# Patient Record
Sex: MALE | Race: WHITE | NOT HISPANIC OR LATINO
[De-identification: names, ages, dates, MRNs, and addresses within clinical notes are randomized per-mention and may not be internally consistent; named-entity substitution may affect disease eponyms.]

---

## 2021-05-14 PROBLEM — Z00.00 ENCOUNTER FOR PREVENTIVE HEALTH EXAMINATION: Status: ACTIVE | Noted: 2021-05-14

## 2021-05-26 ENCOUNTER — NON-APPOINTMENT (OUTPATIENT)
Age: 81
End: 2021-05-26

## 2021-05-26 ENCOUNTER — APPOINTMENT (OUTPATIENT)
Dept: CARDIOLOGY | Facility: CLINIC | Age: 81
End: 2021-05-26
Payer: MEDICARE

## 2021-05-26 VITALS
DIASTOLIC BLOOD PRESSURE: 70 MMHG | TEMPERATURE: 98.1 F | HEART RATE: 93 BPM | SYSTOLIC BLOOD PRESSURE: 128 MMHG | RESPIRATION RATE: 16 BRPM | HEIGHT: 73 IN | BODY MASS INDEX: 25.71 KG/M2 | WEIGHT: 194 LBS | OXYGEN SATURATION: 97 %

## 2021-05-26 DIAGNOSIS — Z78.9 OTHER SPECIFIED HEALTH STATUS: ICD-10-CM

## 2021-05-26 DIAGNOSIS — Z98.890 OTHER SPECIFIED POSTPROCEDURAL STATES: ICD-10-CM

## 2021-05-26 DIAGNOSIS — Z87.39 PERSONAL HISTORY OF OTHER DISEASES OF THE MUSCULOSKELETAL SYSTEM AND CONNECTIVE TISSUE: ICD-10-CM

## 2021-05-26 DIAGNOSIS — Z80.9 FAMILY HISTORY OF MALIGNANT NEOPLASM, UNSPECIFIED: ICD-10-CM

## 2021-05-26 DIAGNOSIS — I38 ENDOCARDITIS, VALVE UNSPECIFIED: ICD-10-CM

## 2021-05-26 DIAGNOSIS — Z83.3 FAMILY HISTORY OF DIABETES MELLITUS: ICD-10-CM

## 2021-05-26 PROCEDURE — 93242 EXT ECG>48HR<7D RECORDING: CPT

## 2021-05-26 PROCEDURE — 93000 ELECTROCARDIOGRAM COMPLETE: CPT

## 2021-05-26 PROCEDURE — 99205 OFFICE O/P NEW HI 60 MIN: CPT

## 2021-05-26 PROCEDURE — 99072 ADDL SUPL MATRL&STAF TM PHE: CPT

## 2021-05-26 NOTE — ASSESSMENT
[FreeTextEntry1] : Today's ECG reviewed.  Atrial fibrillation.  He is asymptomatic.  Continue anticoagulation.  Continue metoprolol for rate control.  Electrophysiology consultation regarding discussion about possible ablation.  Zio patch will be done for 3 days to reassure A. fib rate control.  He had stress test and echocardiogram done.  Will obtain records and review.  Follow-up after Zio patch.

## 2021-05-26 NOTE — HISTORY OF PRESENT ILLNESS
[FreeTextEntry1] : The patient is presenting here today for cardiac evaluation.  The patient is 81-year-old male with a history of hypertension.  He was recently in [place].  Patient was found to be in atrial fibrillation.  He underwent transesophageal echo and cardioversion.  Converted to normal sinus rhythm.  He was anticoagulated with Xarelto.  He was started on metoprolol.  He is physically very active.  No symptoms of any chest pains or shortness of breath.  No syncope.

## 2021-06-02 ENCOUNTER — NON-APPOINTMENT (OUTPATIENT)
Age: 81
End: 2021-06-02

## 2021-06-14 ENCOUNTER — NON-APPOINTMENT (OUTPATIENT)
Age: 81
End: 2021-06-14

## 2021-06-16 ENCOUNTER — APPOINTMENT (OUTPATIENT)
Dept: CARDIOLOGY | Facility: CLINIC | Age: 81
End: 2021-06-16
Payer: MEDICARE

## 2021-06-16 VITALS
HEART RATE: 90 BPM | DIASTOLIC BLOOD PRESSURE: 80 MMHG | HEIGHT: 73 IN | BODY MASS INDEX: 26.77 KG/M2 | OXYGEN SATURATION: 98 % | SYSTOLIC BLOOD PRESSURE: 138 MMHG | WEIGHT: 202 LBS

## 2021-06-16 PROCEDURE — 99215 OFFICE O/P EST HI 40 MIN: CPT

## 2021-06-16 PROCEDURE — 99072 ADDL SUPL MATRL&STAF TM PHE: CPT

## 2021-06-16 NOTE — ASSESSMENT
[FreeTextEntry1] : Today's ECG reviewed.  Atrial fibrillation.  He is asymptomatic.  Continue anticoagulation.  Continue metoprolol for rate control.  Electrophysiology consultation regarding discussion about possible ablation.  Zio patch will be done for 3 days to reassure A. fib rate control.  He had stress test and echocardiogram done.  Records were obtained and reviewed.  He has been feeling fatigued.  The dose of beta-blocker was reduced.  He is feeling better.  Results of Zio patch are pending.  I again advised him to see electrophysiology to consider ablation.  Follow-up after EP consult.

## 2021-06-18 PROCEDURE — 99072 ADDL SUPL MATRL&STAF TM PHE: CPT

## 2021-06-18 PROCEDURE — 93244 EXT ECG>48HR<7D REV&INTERPJ: CPT

## 2021-06-30 ENCOUNTER — APPOINTMENT (OUTPATIENT)
Dept: ELECTROPHYSIOLOGY | Facility: CLINIC | Age: 81
End: 2021-06-30
Payer: MEDICARE

## 2021-06-30 ENCOUNTER — NON-APPOINTMENT (OUTPATIENT)
Age: 81
End: 2021-06-30

## 2021-06-30 VITALS
TEMPERATURE: 97.8 F | BODY MASS INDEX: 26.77 KG/M2 | WEIGHT: 202 LBS | HEART RATE: 92 BPM | OXYGEN SATURATION: 96 % | DIASTOLIC BLOOD PRESSURE: 60 MMHG | HEIGHT: 73 IN | SYSTOLIC BLOOD PRESSURE: 120 MMHG

## 2021-06-30 PROCEDURE — 99072 ADDL SUPL MATRL&STAF TM PHE: CPT

## 2021-06-30 PROCEDURE — 93000 ELECTROCARDIOGRAM COMPLETE: CPT

## 2021-06-30 PROCEDURE — 99204 OFFICE O/P NEW MOD 45 MIN: CPT

## 2021-07-01 ENCOUNTER — APPOINTMENT (OUTPATIENT)
Dept: CARDIOLOGY | Facility: CLINIC | Age: 81
End: 2021-07-01

## 2021-07-04 NOTE — DISCUSSION/SUMMARY
[FreeTextEntry1] : The patient has recurrent atrial fibrillation the rate appears to be controlled.  I do believe he has some symptoms which includes fatigue and shortness of breath with exertion.  The best option for this patient would be restoration of sinus rhythm.  He has failed electrical cardioversion.  The 2 options in this patient has would be either one of the usage of an antiarrhythmic agent to help maintain sinus rhythm or catheter ablation procedure.  Would recommend antiarrhythmic agent and electrical cardioversion first and then assess how well he feels.  If he has significant improvement in symptoms then we would consider catheter ablation procedure.  This was discussed with the patient and he is in agreement.  The choice of antiarrhythmic was fully discussed.  We discussed the usage of amiodarone including side effect profile as well as black box warning time duration.  Recommend a loading phase and then subsequent electrical cardioversion within the next 10 days.  Also discussed possible bloody bowel movement and need to follow-up with GI. \par \par Would recommend starting him on amiodarone now and then repeat an electrical cardioversion in approximately 10 to 14 days after the amiodarone has taken effect.  Again we can discuss whether he should have an ablation subsequent to that.  I would also like for him to have a GI evaluation soon given his need to continue on anticoagulation and question of lower GI bleed.  l\par \par Recommendations:\par \par Start amiodarone\par \par GI evaluation\par \par Continue anticoagulation for now\par \par Subsequent electrical cardioversion after amiodarone load\par \par  no

## 2021-07-04 NOTE — HISTORY OF PRESENT ILLNESS
[FreeTextEntry1] : The patient is a 81-year-old  who is seen in evaluation for persistent atrial fibrillation.\par \par He had  A. fib discovered earlier this year on a routine electrocardiogram.  Based on this finding his physicians in [place] had him undergo a HARRIET and then a subsequent electrical cardioversion.  He is not aware whether his exercise capacity/stamina improved after the cardioversion procedure.  He was on metoprolol 50 mg/day and was feeling fatigued and tired.  He was seen by Dr. Arleth Alicea and the dose was decreased by half and had improvement in symptoms.  He was also noted to have recurrent atrial fibrillation.  He has had symptoms of fatigue as well as shortness of breath when he rushes.  He denies chest pain, dizziness, lightheadedness, syncope or presyncope.  \par \par HARRIET performed on 4/29/2021 showed no evidence of thrombus.  The left ventricular ejection fraction was 50%.  There was mild mitral regurgitation.  He underwent electrical cardioversion with 50 J to sinus rhythm.\par \par He had a stress test performed while he was in [place] and that showed normal myocardial perfusion.\par The patient had a Zio patch monitor for 3 days between 5/26/2021 and 5/29/2021.  The results of this showed that he had predominantly atrial fibrillation with heart rates between 45 to 153 bpm there were occasional PVC and rare PVC couplets.\par \par The patient has a past history of hypertension.  There is no prior history of diabetes.  He has had no prior history of MI or heart failure.\par \par \par The patient has been on Xarelto 20 mg/day.  Today while in the bathroom he noticed pink/slightly reddish material in the toilet bowl and he did not know whether it was related to the urine or bowel movement.

## 2021-07-04 NOTE — REVIEW OF SYSTEMS
[Feeling Fatigued] : feeling fatigued [Dyspnea on exertion] : dyspnea during exertion [Palpitations] : palpitations [Blood in stool] : blood in stool [Fever] : no fever [Blurry Vision] : no blurred vision [Sore Throat] : no sore throat [Chest Discomfort] : no chest discomfort [Cough] : no cough [Abdominal Pain] : no abdominal pain [Urinary Frequency] : no change in urinary frequency [Pain During Urination] : no dysuria [Rash] : no rash [Dizziness] : no dizziness [Under Stress] : not under stress [Easy Bleeding] : no tendency for easy bleeding

## 2021-07-04 NOTE — PHYSICAL EXAM
[No Acute Distress] : no acute distress [Normal Conjunctiva] : normal conjunctiva [Normal Venous Pressure] : normal venous pressure [Normal S1, S2] : normal S1, S2 [No Murmur] : no murmur [No Rub] : no rub [Clear Lung Fields] : clear lung fields [Soft] : abdomen soft [Non Tender] : non-tender [Normal Gait] : normal gait [No Edema] : no edema [No Rash] : no rash [Moves all extremities] : moves all extremities [Alert and Oriented] : alert and oriented [de-identified] : Irregularly irregular

## 2021-07-22 ENCOUNTER — APPOINTMENT (OUTPATIENT)
Dept: CARDIOLOGY | Facility: CLINIC | Age: 81
End: 2021-07-22
Payer: MEDICARE

## 2021-07-22 VITALS
OXYGEN SATURATION: 97 % | HEART RATE: 82 BPM | TEMPERATURE: 98.2 F | SYSTOLIC BLOOD PRESSURE: 110 MMHG | HEIGHT: 73 IN | DIASTOLIC BLOOD PRESSURE: 64 MMHG | BODY MASS INDEX: 26.51 KG/M2 | WEIGHT: 200 LBS

## 2021-07-22 PROCEDURE — 99214 OFFICE O/P EST MOD 30 MIN: CPT

## 2021-07-22 PROCEDURE — 99072 ADDL SUPL MATRL&STAF TM PHE: CPT

## 2021-07-22 RX ORDER — TAMSULOSIN HYDROCHLORIDE 0.4 MG/1
0.4 CAPSULE ORAL
Refills: 0 | Status: DISCONTINUED | COMMUNITY
Start: 2021-05-26 | End: 2021-07-22

## 2021-07-22 NOTE — HISTORY OF PRESENT ILLNESS
[FreeTextEntry1] : The patient is presenting here today for cardiac evaluation.  The patient is 81-year-old male with a history of hypertension.  He was recently in [place].  Patient was found to be in atrial fibrillation.  He underwent transesophageal echo and cardioversion.  Converted to normal sinus rhythm.  He was anticoagulated with Xarelto.  He was started on metoprolol.  He is physically very active.  No symptoms of any chest pains or shortness of breath.  No syncope.\par Since he was started on Xarelto he developed GI bleeding.  He was diagnosed with colon cancer and underwent surgery.  He was restarted on Xarelto.  He never started amiodarone.  He feels fatigued.  No palpitations, chest pain or shortness of breath.

## 2021-07-22 NOTE — ASSESSMENT
[FreeTextEntry1] : Today's ECG reviewed.  Atrial fibrillation.  He is asymptomatic.  Continue anticoagulation.  Continue metoprolol for rate control.  Electrophysiology consultation regarding discussion about possible ablation.  Zio patch will be done for 3 days to reassure A. fib rate control.  He had stress test and echocardiogram done.  Records were obtained and reviewed.  He has been feeling fatigued.  The dose of beta-blocker was reduced.  He is feeling better.  Results of Zio patch are pending.  I again advised him to see electrophysiology to consider ablation.  \par EP consult was done.  He was advised to start amiodarone and then 2 weeks later to have cardioversion however he never started amiodarone.  He was diagnosed with colon cancer and underwent surgery.  He is stable.  I advised not to start amiodarone till chemotherapy is finished.  We will follow him in 3 months and as needed.

## 2021-08-04 ENCOUNTER — NON-APPOINTMENT (OUTPATIENT)
Age: 81
End: 2021-08-04

## 2021-08-05 ENCOUNTER — NON-APPOINTMENT (OUTPATIENT)
Age: 81
End: 2021-08-05

## 2021-08-12 ENCOUNTER — NON-APPOINTMENT (OUTPATIENT)
Age: 81
End: 2021-08-12

## 2021-08-23 ENCOUNTER — APPOINTMENT (OUTPATIENT)
Dept: CARDIOLOGY | Facility: CLINIC | Age: 81
End: 2021-08-23
Payer: MEDICARE

## 2021-08-23 VITALS
DIASTOLIC BLOOD PRESSURE: 70 MMHG | WEIGHT: 196 LBS | HEART RATE: 90 BPM | BODY MASS INDEX: 25.98 KG/M2 | SYSTOLIC BLOOD PRESSURE: 124 MMHG | HEIGHT: 73 IN | OXYGEN SATURATION: 98 %

## 2021-08-23 PROCEDURE — 99214 OFFICE O/P EST MOD 30 MIN: CPT

## 2021-08-23 RX ORDER — AMIODARONE HYDROCHLORIDE 200 MG/1
200 TABLET ORAL DAILY
Qty: 60 | Refills: 0 | Status: DISCONTINUED | COMMUNITY
Start: 2021-06-30 | End: 2021-08-23

## 2021-08-23 NOTE — ASSESSMENT
[FreeTextEntry1] : Today's ECG reviewed.  Atrial fibrillation.  He is asymptomatic.  Continue anticoagulation.  Continue metoprolol for rate control.  Electrophysiology consultation regarding discussion about possible ablation.  Zio patch will be done for 3 days to reassure A. fib rate control.  He had stress test and echocardiogram done.  Records were obtained and reviewed.  He has been feeling fatigued.  The dose of beta-blocker was reduced.  He is feeling better.  Results of Zio patch are pending.  I again advised him to see electrophysiology to consider ablation.  \par EP consult was done.  He was advised to start amiodarone and then 2 weeks later to have cardioversion however he never started amiodarone.  He was diagnosed with colon cancer and underwent surgery.  He is stable.  I advised not to start amiodarone till chemotherapy is finished.  He is anemic.  He is followed by oncology.  He did receive iron transfusions.  He is mildly fatigued but otherwise stable.  Continue present medications including anticoagulation.  No further GI bleeding.  Cardiac follow-up 3 months and as needed.

## 2021-08-24 ENCOUNTER — RX CHANGE (OUTPATIENT)
Age: 81
End: 2021-08-24

## 2021-08-24 ENCOUNTER — NON-APPOINTMENT (OUTPATIENT)
Age: 81
End: 2021-08-24

## 2021-08-27 ENCOUNTER — RX CHANGE (OUTPATIENT)
Age: 81
End: 2021-08-27

## 2021-09-21 ENCOUNTER — APPOINTMENT (OUTPATIENT)
Dept: CARDIOLOGY | Facility: CLINIC | Age: 81
End: 2021-09-21

## 2021-11-01 ENCOUNTER — APPOINTMENT (OUTPATIENT)
Dept: CARDIOLOGY | Facility: CLINIC | Age: 81
End: 2021-11-01
Payer: MEDICARE

## 2021-11-01 VITALS
HEIGHT: 73 IN | TEMPERATURE: 97.1 F | OXYGEN SATURATION: 98 % | WEIGHT: 187.44 LBS | HEART RATE: 67 BPM | DIASTOLIC BLOOD PRESSURE: 60 MMHG | SYSTOLIC BLOOD PRESSURE: 116 MMHG | BODY MASS INDEX: 24.84 KG/M2

## 2021-11-01 DIAGNOSIS — N40.0 BENIGN PROSTATIC HYPERPLASIA WITHOUT LOWER URINARY TRACT SYMPMS: ICD-10-CM

## 2021-11-01 PROCEDURE — 99214 OFFICE O/P EST MOD 30 MIN: CPT

## 2021-11-01 NOTE — ASSESSMENT
[FreeTextEntry1] :   He is asymptomatic.  Continue anticoagulation.  Continue metoprolol for rate control.  Electrophysiology consultation regarding discussion about possible ablation.  Zio patch will be done for 3 days to reassure A. fib rate control.  He had stress test and echocardiogram done.  Records were obtained and reviewed.  He has been feeling fatigued.  The dose of beta-blocker was reduced.  He is feeling better.  Results of Zio patch are pending.  I again advised him to see electrophysiology to consider ablation.  \par EP consult was done.  He was advised to start amiodarone and then 2 weeks later to have cardioversion however he never started amiodarone.  He was diagnosed with colon cancer and underwent surgery.  He is stable.  I advised not to start amiodarone till chemotherapy is finished.  He is anemic.  He is followed by oncology.  He did receive iron transfusions.  He is mildly fatigued but otherwise stable.  Continue present medications including anticoagulation.  No further GI bleeding.  Cardiac follow-up 3 months and as needed.

## 2022-05-20 ENCOUNTER — APPOINTMENT (OUTPATIENT)
Dept: CARDIOLOGY | Facility: CLINIC | Age: 82
End: 2022-05-20
Payer: MEDICARE

## 2022-05-20 VITALS
HEIGHT: 73 IN | BODY MASS INDEX: 25.71 KG/M2 | TEMPERATURE: 98 F | OXYGEN SATURATION: 97 % | WEIGHT: 194 LBS | SYSTOLIC BLOOD PRESSURE: 124 MMHG | DIASTOLIC BLOOD PRESSURE: 74 MMHG | HEART RATE: 70 BPM

## 2022-05-20 DIAGNOSIS — G62.9 POLYNEUROPATHY, UNSPECIFIED: ICD-10-CM

## 2022-05-20 DIAGNOSIS — Z85.038 PERSONAL HISTORY OF OTHER MALIGNANT NEOPLASM OF LARGE INTESTINE: ICD-10-CM

## 2022-05-20 PROCEDURE — 99214 OFFICE O/P EST MOD 30 MIN: CPT

## 2022-05-20 NOTE — HISTORY OF PRESENT ILLNESS
[FreeTextEntry1] : The patient is presenting here today for cardiac evaluation.  The patient is 82-year-old male with a history of hypertension.  He was recently in [place].  Patient was found to be in atrial fibrillation.  He underwent transesophageal echo and cardioversion.  Converted to normal sinus rhythm.  He was anticoagulated with Xarelto.  He was started on metoprolol.  He is physically very active.  No symptoms of any chest pains or shortness of breath.  No syncope.\par Since he was started on Xarelto he developed GI bleeding.  He was diagnosed with colon cancer and underwent surgery.

## 2022-05-20 NOTE — ASSESSMENT
[FreeTextEntry1] :   He is asymptomatic.  Continue anticoagulation.  Continue metoprolol for rate control.  .  The dose of beta-blocker was reduced.  He is feeling better.  Results of Zio patch are pending.  I again advised him to see electrophysiology to consider ablation.  \par  He was diagnosed with colon cancer and underwent surgery.  He is stable.  .  Continue present medications including anticoagulation.  No further GI bleeding.  Cardiac follow-up 6 months and as needed.

## 2022-08-10 ENCOUNTER — APPOINTMENT (OUTPATIENT)
Dept: ORTHOPEDIC SURGERY | Facility: CLINIC | Age: 82
End: 2022-08-10
Payer: MEDICARE

## 2022-08-10 DIAGNOSIS — S46.011A STRAIN OF MUSCLE(S) AND TENDON(S) OF THE ROTATOR CUFF OF RIGHT SHOULDER, INITIAL ENCOUNTER: ICD-10-CM

## 2022-08-10 PROCEDURE — 99214 OFFICE O/P EST MOD 30 MIN: CPT | Mod: 25

## 2022-08-10 PROCEDURE — 20611 DRAIN/INJ JOINT/BURSA W/US: CPT | Mod: RT

## 2022-08-10 PROCEDURE — 99213 OFFICE O/P EST LOW 20 MIN: CPT | Mod: 25

## 2022-08-10 PROCEDURE — 99203 OFFICE O/P NEW LOW 30 MIN: CPT | Mod: 25

## 2022-08-10 PROCEDURE — 73030 X-RAY EXAM OF SHOULDER: CPT | Mod: RT

## 2022-08-10 NOTE — HISTORY OF PRESENT ILLNESS
[de-identified] : Patient reports no improvement since his last visit in 2020, despite PT and HEP. He notes an additional fall onto an outstretched arm in a parking lot recently. The pain is worse at night and with ROM behind the back. He describes that pain as burning, stabbing and sometime a pinching/catching sensation. He is unable to take NSAIDs.

## 2022-08-10 NOTE — PHYSICAL EXAM
[NL (0-70)] : full internal rotation 0-70 degrees [NL (0-90)] : full external rotation 0-90 degrees [3 ___] : forward flexion 3[unfilled]/5 [3___] : abduction 3[unfilled]/5 [4___] : internal rotation 4[unfilled]/5 [] : motor and sensory intact distally [Right] : right shoulder [There are no fractures, subluxations or dislocations. No significant abnormalities are seen] : There are no fractures, subluxations or dislocations. No significant abnormalities are seen [[id number]] : active forward flexion 130 degrees [de-identified] : active abduction 110 degrees

## 2022-08-31 ENCOUNTER — APPOINTMENT (OUTPATIENT)
Dept: ORTHOPEDIC SURGERY | Facility: CLINIC | Age: 82
End: 2022-08-31

## 2022-08-31 VITALS — BODY MASS INDEX: 25.71 KG/M2 | WEIGHT: 194 LBS | HEIGHT: 73 IN

## 2022-08-31 DIAGNOSIS — S33.5XXA SPRAIN OF LIGAMENTS OF LUMBAR SPINE, INITIAL ENCOUNTER: ICD-10-CM

## 2022-08-31 PROCEDURE — 99214 OFFICE O/P EST MOD 30 MIN: CPT

## 2022-08-31 PROCEDURE — 73502 X-RAY EXAM HIP UNI 2-3 VIEWS: CPT | Mod: RT

## 2022-08-31 NOTE — PHYSICAL EXAM
[] : patient ambulates without assistive device [Right] : right hip with pelvis [AP] : anteroposterior [Lateral] : lateral [Mild arthritis (Tonnis Grade 1)] : Mild arthritis (Tonnis Grade 1)

## 2022-08-31 NOTE — DATA REVIEWED
[CT Scan] : CT scan [Pelvis] : pelvis [Report was reviewed and noted in the chart] : The report was reviewed and noted in the chart [I reviewed the films/CD and agree] : I reviewed the films/CD and agree [FreeTextEntry1] : EXAM: CT ABD/PELVIS W/O CONTRAST  CLINICAL HISTORY: Male, 82 years old. rt flank pain; SCT  TECHNIQUE: Axial images were obtained from the diaphragm to the pubic symphysis. Sagittal and coronal reformations were obtained.  Contrast:   RADIATION DOSE: CTDIvol Mean: 6.89, mGy/DLP: 385.29, mGy.cm Technologist Comments:  COMPARISON: CT chest abdomen pelvis from 7/2/2021  FINDINGS:  LUNG BASES: Clear. No pleural effusion.  ABDOMEN/PELVIS:  LIVER: Normal size. No mass. BILIARY TRACT: No dilatation. Contracted gallbladder. PANCREAS: No focal or diffuse pathology. SPLEEN: Normal size. No focal lesions. ADRENALS: No nodule or mass. KIDNEYS AND BLADDER: Normal morphology. No renal, ureteral or bladder calculus. No hydronephrosis or hydroureter. No perinephric or periureteral fat infiltration. Kidneys and bladder unremarkable for other pathology. BOWEL: Small hiatus hernia. Retained fluid and food particles noted in the stomach. Normal caliber small bowel. Status post right colon resection with a intact anastomosis near the right mid abdomen. Normal caliber. No wall thickening. No evidence of bowel obstruction. PERITONEUM: No ascites, free air, or fluid collection. RETROPERITONEUM: No lymphadenopathy. VESSELS: Normal caliber aorta. Abdominal wall: Bilateral small fat-containing inguinal hernias.  REPRODUCTIVE ORGANS: Normal size. No focal lesion. PELVIC SIDEWALLS AND GROIN: No lymphadenopathy.  BONES: No aggressive osseous lesions.   IMPRESSION:  No acute intra-abdominal findings.

## 2022-08-31 NOTE — DISCUSSION/SUMMARY
[de-identified] : I reviewed patient's radiographs and discussed his condition and treatment options.  I advised seeing Dr. Campbell for lumbar pain.  Patient voiced understanding and agreement with the plan.\par

## 2022-08-31 NOTE — HISTORY OF PRESENT ILLNESS
[de-identified] : Patient presents for evaluation of right hip/low back pain.  He reports attending dinner party on 8/26/22 and suddenly experiencing sharp, stabbing pains along the right lower back to hip area.  He denies any groin pain.  He was seen in ER and kidney stone was ruled out.  He reports some improvement with muscle relaxant.  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  About 3-4 years ago, he had back issues and underwent injections by Dr. Campbell with resolution of pain.

## 2022-09-01 ENCOUNTER — FORM ENCOUNTER (OUTPATIENT)
Age: 82
End: 2022-09-01

## 2022-10-04 ENCOUNTER — APPOINTMENT (OUTPATIENT)
Dept: CARDIOLOGY | Facility: CLINIC | Age: 82
End: 2022-10-04

## 2022-10-04 ENCOUNTER — NON-APPOINTMENT (OUTPATIENT)
Age: 82
End: 2022-10-04

## 2022-10-04 VITALS
HEIGHT: 73 IN | WEIGHT: 190 LBS | BODY MASS INDEX: 25.18 KG/M2 | DIASTOLIC BLOOD PRESSURE: 62 MMHG | HEART RATE: 61 BPM | OXYGEN SATURATION: 96 % | SYSTOLIC BLOOD PRESSURE: 118 MMHG

## 2022-10-04 PROCEDURE — 99214 OFFICE O/P EST MOD 30 MIN: CPT | Mod: 25

## 2022-10-04 PROCEDURE — 93000 ELECTROCARDIOGRAM COMPLETE: CPT

## 2022-10-04 RX ORDER — GABAPENTIN 100 MG/1
100 CAPSULE ORAL
Refills: 0 | Status: DISCONTINUED | COMMUNITY
End: 2022-10-04

## 2022-10-04 RX ORDER — PNV NO.95/FERROUS FUM/FOLIC AC 28MG-0.8MG
100 TABLET ORAL
Refills: 0 | Status: DISCONTINUED | COMMUNITY
Start: 2022-05-20 | End: 2022-10-04

## 2022-10-04 RX ORDER — LACTOBACILLUS ACIDOPHILUS/PECT 30 MG-20MG
TABLET ORAL
Refills: 0 | Status: DISCONTINUED | COMMUNITY
End: 2022-10-04

## 2022-10-04 NOTE — ASSESSMENT
[FreeTextEntry1] :   He is asymptomatic.  Continue anticoagulation.  Continue metoprolol for rate control.  .  ECG was done today.  It was reviewed.  Normal sinus rhythm with APBs.  The dose of beta-blocker was reduced.  He is feeling better.    I again advised him to see electrophysiology to consider ablation.  \par  He was diagnosed with colon cancer and underwent surgery.  He is stable.  .  Continue present medications including anticoagulation.  No further GI bleeding.  Cardiac follow-up 6 months and as needed.

## 2022-10-04 NOTE — HISTORY OF PRESENT ILLNESS
[FreeTextEntry1] : The patient is presenting here today for cardiac evaluation.  The patient is 82-year-old male with a history of hypertension.  He was  in [place].  Patient was found to be in atrial fibrillation.  He underwent transesophageal echo and cardioversion.  Converted to normal sinus rhythm.  He was anticoagulated with Xarelto.  He was started on metoprolol.  He is physically very active.  No symptoms of any chest pains or shortness of breath.  No syncope.\par Since he was started on Xarelto he developed GI bleeding.  He was diagnosed with colon cancer and underwent surgery.

## 2022-11-02 ENCOUNTER — NON-APPOINTMENT (OUTPATIENT)
Age: 82
End: 2022-11-02

## 2023-05-18 ENCOUNTER — APPOINTMENT (OUTPATIENT)
Dept: CARDIOLOGY | Facility: CLINIC | Age: 83
End: 2023-05-18
Payer: MEDICARE

## 2023-05-18 NOTE — HISTORY OF PRESENT ILLNESS
[FreeTextEntry1] : The patient is presenting here today for cardiac evaluation.  The patient is 83-year-old male with a history of hypertension.  He was  in [place].  Patient was found to be in atrial fibrillation.  He underwent transesophageal echo and cardioversion.  Converted to normal sinus rhythm.  He was anticoagulated with Xarelto.  He was started on metoprolol.  He is physically very active.  No symptoms of any chest pains or shortness of breath.  No syncope.\par Since he was started on Xarelto he developed GI bleeding.  He was diagnosed with colon cancer and underwent surgery.

## 2023-05-23 ENCOUNTER — APPOINTMENT (OUTPATIENT)
Dept: CARDIOLOGY | Facility: CLINIC | Age: 83
End: 2023-05-23
Payer: MEDICARE

## 2023-05-23 VITALS
SYSTOLIC BLOOD PRESSURE: 122 MMHG | HEIGHT: 73 IN | BODY MASS INDEX: 26.24 KG/M2 | OXYGEN SATURATION: 97 % | DIASTOLIC BLOOD PRESSURE: 74 MMHG | WEIGHT: 198 LBS | HEART RATE: 66 BPM

## 2023-05-23 PROCEDURE — 99214 OFFICE O/P EST MOD 30 MIN: CPT

## 2023-05-23 NOTE — ASSESSMENT
[FreeTextEntry1] :   He is asymptomatic.  Continue anticoagulation.  Continue metoprolol for rate control.  .   He is feeling better.    I again advised him to see electrophysiology to consider ablation.  \par  He was diagnosed with colon cancer and underwent surgery.  He is stable.  .  Continue present medications including anticoagulation.  No further GI bleeding.  Cardiac follow-up 6 months and as needed.

## 2023-09-06 ENCOUNTER — APPOINTMENT (OUTPATIENT)
Dept: CARDIOLOGY | Facility: CLINIC | Age: 83
End: 2023-09-06
Payer: MEDICARE

## 2023-09-06 VITALS
HEIGHT: 73 IN | SYSTOLIC BLOOD PRESSURE: 110 MMHG | DIASTOLIC BLOOD PRESSURE: 70 MMHG | WEIGHT: 198 LBS | OXYGEN SATURATION: 97 % | HEART RATE: 100 BPM | BODY MASS INDEX: 26.24 KG/M2

## 2023-09-06 DIAGNOSIS — R01.1 CARDIAC MURMUR, UNSPECIFIED: ICD-10-CM

## 2023-09-06 DIAGNOSIS — R06.02 SHORTNESS OF BREATH: ICD-10-CM

## 2023-09-06 PROCEDURE — 99214 OFFICE O/P EST MOD 30 MIN: CPT

## 2023-09-06 NOTE — ASSESSMENT
[FreeTextEntry1] :   He is asymptomatic.  Continue anticoagulation.  Continue metoprolol for rate control.  .   He is feeling better.    I again advised him to see electrophysiology to consider ablation.    He was diagnosed with colon cancer and underwent surgery.  He is stable.  .  Continue present medications including anticoagulation.  No further GI bleeding.  Cardiac follow-up 6 months and as needed. The patient is leaving to [place].  He will be back here in 6 months.  He will follow-up with his doctors in [place] and he will follow-up with us when he gets back.

## 2023-09-12 ENCOUNTER — APPOINTMENT (OUTPATIENT)
Dept: CARDIOLOGY | Facility: CLINIC | Age: 83
End: 2023-09-12

## 2023-09-13 ENCOUNTER — APPOINTMENT (OUTPATIENT)
Dept: VASCULAR SURGERY | Facility: CLINIC | Age: 83
End: 2023-09-13
Payer: MEDICARE

## 2023-09-13 VITALS
WEIGHT: 198 LBS | BODY MASS INDEX: 26.24 KG/M2 | SYSTOLIC BLOOD PRESSURE: 126 MMHG | HEIGHT: 73 IN | DIASTOLIC BLOOD PRESSURE: 80 MMHG

## 2023-09-13 PROCEDURE — 99203 OFFICE O/P NEW LOW 30 MIN: CPT

## 2023-09-13 PROCEDURE — 99213 OFFICE O/P EST LOW 20 MIN: CPT

## 2023-09-18 ENCOUNTER — APPOINTMENT (OUTPATIENT)
Dept: VASCULAR SURGERY | Facility: AMBULATORY MEDICAL SERVICES | Age: 83
End: 2023-09-18
Payer: MEDICARE

## 2023-09-18 PROCEDURE — 36590 REMOVAL TUNNELED CV CATH: CPT

## 2023-09-27 ENCOUNTER — APPOINTMENT (OUTPATIENT)
Dept: CARDIOLOGY | Facility: CLINIC | Age: 83
End: 2023-09-27
Payer: MEDICARE

## 2023-09-27 ENCOUNTER — APPOINTMENT (OUTPATIENT)
Dept: VASCULAR SURGERY | Facility: CLINIC | Age: 83
End: 2023-09-27
Payer: MEDICARE

## 2023-09-27 VITALS
HEIGHT: 73 IN | WEIGHT: 198 LBS | DIASTOLIC BLOOD PRESSURE: 84 MMHG | SYSTOLIC BLOOD PRESSURE: 122 MMHG | BODY MASS INDEX: 26.24 KG/M2

## 2023-09-27 VITALS
WEIGHT: 200 LBS | HEIGHT: 73 IN | HEART RATE: 97 BPM | DIASTOLIC BLOOD PRESSURE: 66 MMHG | OXYGEN SATURATION: 96 % | SYSTOLIC BLOOD PRESSURE: 128 MMHG | BODY MASS INDEX: 26.51 KG/M2

## 2023-09-27 DIAGNOSIS — E78.1 PURE HYPERGLYCERIDEMIA: ICD-10-CM

## 2023-09-27 DIAGNOSIS — C18.2 MALIGNANT NEOPLASM OF ASCENDING COLON: ICD-10-CM

## 2023-09-27 DIAGNOSIS — I34.9 NONRHEUMATIC MITRAL VALVE DISORDER, UNSPECIFIED: ICD-10-CM

## 2023-09-27 PROCEDURE — 99214 OFFICE O/P EST MOD 30 MIN: CPT

## 2023-09-27 PROCEDURE — 99024 POSTOP FOLLOW-UP VISIT: CPT

## 2023-10-02 RX ORDER — ROSUVASTATIN CALCIUM 10 MG/1
10 TABLET, FILM COATED ORAL
Refills: 0 | Status: ACTIVE | COMMUNITY

## 2023-10-02 RX ORDER — METOPROLOL TARTRATE 50 MG/1
50 TABLET, FILM COATED ORAL DAILY
Qty: 90 | Refills: 3 | Status: ACTIVE | COMMUNITY
Start: 2021-07-14

## 2023-10-02 RX ORDER — RIVAROXABAN 20 MG/1
20 TABLET, FILM COATED ORAL
Qty: 30 | Refills: 0 | Status: ACTIVE | COMMUNITY

## 2024-04-28 ENCOUNTER — NON-APPOINTMENT (OUTPATIENT)
Age: 84
End: 2024-04-28

## 2024-05-15 ENCOUNTER — APPOINTMENT (OUTPATIENT)
Dept: CARDIOLOGY | Facility: CLINIC | Age: 84
End: 2024-05-15
Payer: MEDICARE

## 2024-05-15 VITALS
OXYGEN SATURATION: 97 % | BODY MASS INDEX: 25.86 KG/M2 | SYSTOLIC BLOOD PRESSURE: 116 MMHG | DIASTOLIC BLOOD PRESSURE: 74 MMHG | WEIGHT: 196 LBS | HEART RATE: 71 BPM

## 2024-05-15 DIAGNOSIS — R07.89 OTHER CHEST PAIN: ICD-10-CM

## 2024-05-15 DIAGNOSIS — I48.91 UNSPECIFIED ATRIAL FIBRILLATION: ICD-10-CM

## 2024-05-15 DIAGNOSIS — I25.10 ATHEROSCLEROTIC HEART DISEASE OF NATIVE CORONARY ARTERY W/OUT ANGINA PECTORIS: ICD-10-CM

## 2024-05-15 DIAGNOSIS — I10 ESSENTIAL (PRIMARY) HYPERTENSION: ICD-10-CM

## 2024-05-15 PROCEDURE — 99214 OFFICE O/P EST MOD 30 MIN: CPT

## 2024-05-15 PROCEDURE — 93000 ELECTROCARDIOGRAM COMPLETE: CPT

## 2024-05-15 NOTE — ASSESSMENT
[FreeTextEntry1] : CARI BOYKIN  is a 84 year M  who presents today May 15, 2024 with the above history and the following active issues.   Atrial fibrillation. Persistent at this time.  Recommend: - continuing AC with Xarelto. Recent CBC requested. Bleeding precautions reviewed - change Metoprolol tartrate to succinate - continue to monitor echo  - consider consultation with EP   Chest pain Recommend ischemic evaluation with CTA coronary arteries If recurrent symptoms ER evaluation Continue statin and beta blocker  HLD, recommend continue Rosuvastatin 10mg.  Lifestyle and risk factor modification. Low cholesterol diet.  Red flag symptoms which would warrant sooner emergent evaluation reviewed with the patient.  Questions and concerns were addressed and answered.  Limitations of non-invasive testing reviewed.  Sincerely,  Roseline Redmond PA-C Patients history, testing and plan reviewed with supervising MD: Dr. Krystal Lyons

## 2024-05-15 NOTE — CARDIOLOGY SUMMARY
[de-identified] : EKG 3/7/2023 AF EKG 5/15/24 AF  [de-identified] : - nuclear stress test 4/13/2023: normal perfusion with no evidence of ischemia (outside study) [de-identified] : - echo 4/13/23: EF 55%, mild LAE, mild MR (outside study)

## 2024-05-15 NOTE — HISTORY OF PRESENT ILLNESS
[FreeTextEntry1] : CARI BOYKIN  is a 84 year M  who presents today May 15, 2024 requesting to be seen for chest pain. Several days ago there was atypical chest pain to the left side of chest lasting approx 15 min. No associated shortness of breath, diaphoresis, nausea, arm pain or jaw pain.  Today he is feeling well. Continues to follow with cardiology team in [place].  Recent hospital stay for diverticulitis treated with abx.   Also followed by cardiologist Dr. Hamm in [place] where he has most of his cardiac care and testing performed.  History of Afib first diagnosed in 2021 as per the pt. He initially underwent a DCCV which was successful in converting to SR but he subsequently went back into Afib. Maintained on AC with Xarelto. Taking Metoprolol tartrate 50mg QD.  Asymptomatic from arrhythmia standpoint.  Extremely active with being a  with no new exertional complaints.  Diagnosed with colon cancer in 2021. Status post treatment and had his port removed recently.  Today he denies unusual shortness of breath, lightheadedness, dizziness, near syncope or syncope.   There is no prior history of myocardial infarction, coronary revascularization, history of ischemic heart disease, or symptomatic congestive heart failure.

## 2024-06-12 ENCOUNTER — APPOINTMENT (OUTPATIENT)
Dept: VASCULAR SURGERY | Facility: CLINIC | Age: 84
End: 2024-06-12
Payer: MEDICARE

## 2024-06-12 VITALS
HEIGHT: 73 IN | SYSTOLIC BLOOD PRESSURE: 122 MMHG | BODY MASS INDEX: 26.24 KG/M2 | DIASTOLIC BLOOD PRESSURE: 74 MMHG | WEIGHT: 198 LBS

## 2024-06-12 DIAGNOSIS — Z85.038 PERSONAL HISTORY OF OTHER MALIGNANT NEOPLASM OF LARGE INTESTINE: ICD-10-CM

## 2024-06-12 DIAGNOSIS — K42.9 UMBILICAL HERNIA W/OUT OBSTRUCTION OR GANGRENE: ICD-10-CM

## 2024-06-12 PROCEDURE — 99214 OFFICE O/P EST MOD 30 MIN: CPT

## 2024-06-12 RX ORDER — UBIDECARENONE/VIT E ACET 100MG-5
CAPSULE ORAL
Refills: 0 | Status: ACTIVE | COMMUNITY

## 2024-06-12 RX ORDER — ELECTROLYTES/DEXTROSE
100 SOLUTION, ORAL ORAL
Refills: 0 | Status: ACTIVE | COMMUNITY

## 2024-06-12 NOTE — ASSESSMENT
[FreeTextEntry1] : I recommended umbilical hernia to be repaired.  Patient would like to wait with surgery until the end of the summer.

## 2024-06-12 NOTE — PHYSICAL EXAM
[JVD] : no jugular venous distention  [Carotid Bruits] : no carotid bruits [Normal Breath Sounds] : Normal breath sounds [Normal Heart Sounds] : normal heart sounds [2+] : left 2+ [1+] : left 1+ [Abdominal Masses] : No abdominal masses [Abdomen Tenderness] : ~T ~M No abdominal tenderness [No Rash or Lesion] : No rash or lesion [Anxious] : anxious [de-identified] : No acute distress [de-identified] : Relatively large but reducible umbilical hernia.  Right paramedian incision is well-healed.

## 2024-06-12 NOTE — HISTORY OF PRESENT ILLNESS
[de-identified] : Patient is follow-up visit after right hemicolectomy more than 3 years ago.  Patient is also known to have an umbilical hernia.  Presently denies any abdominal pain.  There is no changes in the bowel habits.  As per the patient he is cancer free.

## 2024-07-11 ENCOUNTER — NON-APPOINTMENT (OUTPATIENT)
Age: 84
End: 2024-07-11

## 2024-07-12 ENCOUNTER — RESULT REVIEW (OUTPATIENT)
Age: 84
End: 2024-07-12

## 2024-07-13 ENCOUNTER — NON-APPOINTMENT (OUTPATIENT)
Age: 84
End: 2024-07-13

## 2024-07-15 ENCOUNTER — APPOINTMENT (OUTPATIENT)
Dept: CARDIOLOGY | Facility: CLINIC | Age: 84
End: 2024-07-15

## 2024-07-18 ENCOUNTER — APPOINTMENT (OUTPATIENT)
Dept: CARDIOLOGY | Facility: CLINIC | Age: 84
End: 2024-07-18
Payer: MEDICARE

## 2024-07-18 VITALS
OXYGEN SATURATION: 96 % | HEART RATE: 82 BPM | SYSTOLIC BLOOD PRESSURE: 112 MMHG | HEIGHT: 73 IN | BODY MASS INDEX: 25.84 KG/M2 | WEIGHT: 195 LBS | DIASTOLIC BLOOD PRESSURE: 70 MMHG

## 2024-07-18 DIAGNOSIS — Z13.6 ENCOUNTER FOR SCREENING FOR CARDIOVASCULAR DISORDERS: ICD-10-CM

## 2024-07-18 PROCEDURE — 99214 OFFICE O/P EST MOD 30 MIN: CPT

## 2024-07-18 PROCEDURE — G2211 COMPLEX E/M VISIT ADD ON: CPT

## 2024-07-18 RX ORDER — ASPIRIN ENTERIC COATED TABLETS 81 MG 81 MG/1
81 TABLET, DELAYED RELEASE ORAL
Refills: 0 | Status: COMPLETED | COMMUNITY
Start: 2024-07-12 | End: 2024-07-18

## 2024-07-18 RX ORDER — ASPIRIN ENTERIC COATED TABLETS 81 MG 81 MG/1
81 TABLET, DELAYED RELEASE ORAL DAILY
Qty: 90 | Refills: 3 | Status: DISCONTINUED | COMMUNITY
Start: 2024-07-12 | End: 2024-07-18

## 2024-07-18 RX ORDER — ROSUVASTATIN CALCIUM 20 MG/1
20 TABLET, FILM COATED ORAL
Qty: 90 | Refills: 2 | Status: ACTIVE | COMMUNITY
Start: 2024-07-18 | End: 1900-01-01

## 2024-07-18 RX ORDER — METOPROLOL SUCCINATE 50 MG/1
50 TABLET, EXTENDED RELEASE ORAL
Qty: 90 | Refills: 2 | Status: ACTIVE | COMMUNITY
Start: 2024-07-18 | End: 1900-01-01

## 2024-09-05 ENCOUNTER — APPOINTMENT (OUTPATIENT)
Dept: CARDIOLOGY | Facility: CLINIC | Age: 84
End: 2024-09-05

## 2024-10-02 ENCOUNTER — APPOINTMENT (OUTPATIENT)
Dept: VASCULAR SURGERY | Facility: CLINIC | Age: 84
End: 2024-10-02
Payer: MEDICARE

## 2024-10-02 VITALS
WEIGHT: 315 LBS | SYSTOLIC BLOOD PRESSURE: 130 MMHG | BODY MASS INDEX: 41.75 KG/M2 | DIASTOLIC BLOOD PRESSURE: 70 MMHG | HEIGHT: 73 IN

## 2024-10-02 DIAGNOSIS — M79.605 PAIN IN LEFT LEG: ICD-10-CM

## 2024-10-02 PROCEDURE — 99213 OFFICE O/P EST LOW 20 MIN: CPT

## 2024-10-02 PROCEDURE — 93971 EXTREMITY STUDY: CPT | Mod: LT

## 2024-10-02 NOTE — PHYSICAL EXAM
[JVD] : no jugular venous distention  [Normal Breath Sounds] : Normal breath sounds [Normal Heart Sounds] : normal heart sounds [1+] : left 1+ [Ankle Swelling (On Exam)] : not present [Varicose Veins Of Lower Extremities] : not present [] : not present [Abdomen Masses] : No abdominal masses [Abdomen Tenderness] : ~T ~M No abdominal tenderness [No HSM] : no hepatosplenomegaly [No Rash or Lesion] : No rash or lesion [Alert] : alert [Anxious] : anxious [de-identified] : No acute distress

## 2024-10-02 NOTE — HISTORY OF PRESENT ILLNESS
[FreeTextEntry1] : Patient complains about severe pain in the left calf area.  Patient was seen at [facility] emergency room.  Venous ultrasound of the left leg was negative for DVT.  Patient is being anticoagulated with his Xarelto for chronic atrial fibrillation.  Recently he started to complain about lower back problems.

## 2024-10-02 NOTE — PROCEDURE
[FreeTextEntry1] : I performed a limited ultrasound of the left lower extremity.  Area of the left lateral calf shows mild edema.  There is no fluid collection.  No evidence of DVT.  No signs of hematoma formation.

## 2024-10-03 NOTE — DISCUSSION/SUMMARY
[EKG obtained to assist in diagnosis and management of assessed problem(s)] : EKG obtained to assist in diagnosis and management of assessed problem(s) 1-2x/week